# Patient Record
Sex: MALE | Race: WHITE | URBAN - METROPOLITAN AREA
[De-identification: names, ages, dates, MRNs, and addresses within clinical notes are randomized per-mention and may not be internally consistent; named-entity substitution may affect disease eponyms.]

---

## 2017-03-31 ENCOUNTER — OFFICE VISIT (OUTPATIENT)
Dept: URGENT CARE | Facility: RETAIL CLINIC | Age: 37
End: 2017-03-31
Payer: COMMERCIAL

## 2017-03-31 VITALS
SYSTOLIC BLOOD PRESSURE: 128 MMHG | HEART RATE: 105 BPM | WEIGHT: 241 LBS | OXYGEN SATURATION: 97 % | TEMPERATURE: 99.1 F | DIASTOLIC BLOOD PRESSURE: 76 MMHG | BODY MASS INDEX: 33.61 KG/M2

## 2017-03-31 DIAGNOSIS — R50.9 FEVER, UNSPECIFIED: Primary | ICD-10-CM

## 2017-03-31 DIAGNOSIS — J02.9 ACUTE PHARYNGITIS, UNSPECIFIED ETIOLOGY: ICD-10-CM

## 2017-03-31 LAB
FLUAV AG UPPER RESP QL IA.RAPID: NORMAL
FLUBV AG UPPER RESP QL IA.RAPID: NORMAL
S PYO AG THROAT QL IA.RAPID: NORMAL

## 2017-03-31 PROCEDURE — 87081 CULTURE SCREEN ONLY: CPT | Performed by: NURSE PRACTITIONER

## 2017-03-31 PROCEDURE — 99202 OFFICE O/P NEW SF 15 MIN: CPT | Performed by: NURSE PRACTITIONER

## 2017-03-31 PROCEDURE — 87804 INFLUENZA ASSAY W/OPTIC: CPT | Mod: QW | Performed by: NURSE PRACTITIONER

## 2017-03-31 PROCEDURE — 87880 STREP A ASSAY W/OPTIC: CPT | Mod: QW | Performed by: NURSE PRACTITIONER

## 2017-03-31 NOTE — NURSING NOTE
"Chief Complaint   Patient presents with     Generalized Body Aches     2-3 days.      Nose Problem     2-3 days       Initial /76 (BP Location: Right arm, Patient Position: Chair, Cuff Size: Adult Regular)  Pulse 105  Temp 99.1  F (37.3  C) (Tympanic)  Wt 241 lb (109.3 kg)  SpO2 97%  BMI 33.61 kg/m2 Estimated body mass index is 33.61 kg/(m^2) as calculated from the following:    Height as of 3/3/16: 5' 11\" (1.803 m).    Weight as of this encounter: 241 lb (109.3 kg).  Medication Reconciliation: complete   Amparo Le CMA (AAMA)      "

## 2017-03-31 NOTE — MR AVS SNAPSHOT
"              After Visit Summary   3/31/2017    Germain Barrett    MRN: [id number]           Patient Information     Date Of Birth          1980        Visit Information        Provider Department      3/31/2017 3:10 PM Myles Baptiste APRN [facility]        Today's Diagnoses     Fever, unspecified    -  1    Acute pharyngitis, unspecified etiology           Follow-ups after your visit        Who to contact     You can reach your care team any time of the day by calling [phone].  Notification of test results:  If you have an abnormal lab result, we will notify you by phone as soon as possible.         Additional Information About Your Visit        MyChart Information     [employer] lets you send messages to your doctor, view your test results, renew your prescriptions, schedule appointments and more. To sign up, go to [email or web address] . Click on \"Log in\" on the left side of the screen, which will take you to the Welcome page. Then click on \"Sign up Now\" on the right side of the page.     You will be asked to enter the access code listed below, as well as some personal information. Please follow the directions to create your username and password.     Your access code is: [email or web address]  Expires: 2017  3:47 PM     Your access code will  in 90 days. If you need help or a new code, please call your [place] clinic or [phone].        Care EveryWhere ID     This is your [facility] EveryWhere ID. This could be used by other organizations to access your [place] medical records  [id number]        Your Vitals Were     Pulse Temperature Pulse Oximetry BMI (Body Mass Index)          105 99.1  F (37.3  C) (Tympanic) 97% 33.61 kg/m2         Blood Pressure from Last 3 Encounters:   17 128/76   16 115/80   16 110/76    Weight from Last 3 Encounters:   17 241 lb (109.3 kg)   16 228 lb 14.4 oz (103.8 kg)   16 230 lb (104.3 kg)              We " Performed the Following     BETA STREP GROUP A R/O CULTURE     INFLUENZA A/B ANTIGEN     RAPID STREP SCREEN        Primary Care Provider Office Phone # Fax #    Kyle Huston [phone]       [address] [place] [address]        Thank you!     Thank you for choosing [facility]  for your care. Our goal is always to provide you with excellent care. Hearing back from our patients is one way we can continue to improve our services. Please take a few minutes to complete the written survey that you may receive in the mail after your visit with us. Thank you!             Your Updated Medication List - Protect others around you: Learn how to safely use, store and throw away your medicines at www.disposemymeds.org.          This list is accurate as of: 3/31/17  3:47 PM.  Always use your most recent med list.                   Brand Name Dispense Instructions for use    oxyCODONE-acetaminophen 5-325 MG per tablet    PERCOCET    45 tablet    Take 1-2 tablets by mouth every 4 hours as needed for pain (moderate to severe)

## 2017-03-31 NOTE — PROGRESS NOTES
[facility] note    SUBJECTIVE:  Germain Barrett is a 36 year old male who presents to [facility] with chief complaint of sore throat.    Onset of symptoms was 2-3 day(s) ago.    Course of illness: sudden onset and worsening.    Severity moderate  Course of illness:  Current and Associated symptoms: nasal congestion, rhinorrhea, sore throat, facial pain/pressure, headache, myalgias (neck and shoulders), some malaise and sneezing  Treatment measures tried at home include OTC meds.  Predisposing factors include None.    Current Outpatient Prescriptions   Medication     oxyCODONE-acetaminophen (PERCOCET) 5-325 MG per tablet     No current facility-administered medications for this visit.      Facility-Administered Medications Ordered in Other Visits   Medication     dimenhyDRINATE (DRAMAMINE) injection     scopolamine (TRANSDERM) 1 MG/3DAYS patch     PAST MEDICAL HISTORY: History reviewed. No pertinent past medical history.    PAST SURGICAL HISTORY:   Past Surgical History:   Procedure Laterality Date     COLONOSCOPY N/A 1/8/2016    Procedure: COMBINED COLONOSCOPY, SINGLE OR MULTIPLE BIOPSY/POLYPECTOMY BY BIOPSY;  Surgeon: Logan Hermosillo MD;  Location:  GI     EXCISE MASS TRUNK N/A 3/3/2016    Procedure: EXCISE MASS TRUNK;  Surgeon: Kyle Huston MD;  Location: [facility] OR     HERNIORRHAPHY UMBILICAL N/A 3/3/2016    Procedure: HERNIORRHAPHY UMBILICAL;  Surgeon: Kyle Huston MD;  Location: [facility] OR     LAPAROSCOPIC HERNIORRHAPHY INGUINAL Right 3/3/2016    Procedure: LAPAROSCOPIC HERNIORRHAPHY INGUINAL;  Surgeon: Kyle Huston MD;  Location: [facility] OR       FAMILY HISTORY:   Family History   Problem Relation Age of Onset     Colon Cancer Father      DIABETES No family hx of      Coronary Artery Disease No family hx of      Hypertension No family hx of      Breast Cancer No family hx of      Anesthesia Reaction No family hx of        SOCIAL HISTORY:    Social History   Substance Use Topics     Smoking status: Never Smoker     Smokeless tobacco: Never Used     Alcohol use 0.0 oz/week     0 Standard drinks or equivalent per week     ROS:  Review of systems negative except as stated above.    OBJECTIVE:   Vitals:    03/31/17 1506   BP: 128/76   BP Location: Right arm   Patient Position: Chair   Cuff Size: Adult Regular   Pulse: 105   Temp: 99.1  F (37.3  C)   TempSrc: Tympanic   SpO2: 97%   Weight: 241 lb (109.3 kg)     GENERAL APPEARANCE: alert, mild distress and cooperative  EYES: EOMI,  PERRL, conjunctiva clear  HENT: ear canals and TM's normal.  Nose normal.  Pharynx erythematous with some exudate noted.  NECK: supple, non-tender to palpation, no adenopathy noted  RESP: lungs clear to auscultation - no rales, rhonchi or wheezes  CV: regular rates and rhythm, normal S1 S2, no murmur noted  ABDOMEN:  soft, nontender, no HSM or masses and bowel sounds normal  SKIN: no suspicious lesions or rashes  NEURO: Normal strength and tone, sensory exam grossly normal,  normal speech and mentation    Rapid Strep test is negative; await throat culture results.  Influenza is: both negative.    ASSESSMENT:     Fever, unspecified  Acute pharyngitis, unspecified etiology      PLAN:   Outpatient Encounter Prescriptions as of 3/31/2017   Medication Sig Dispense Refill     oxyCODONE-acetaminophen (PERCOCET) 5-325 MG per tablet Take 1-2 tablets by mouth every 4 hours as needed for pain (moderate to severe) (Patient not taking: Reported on 3/31/2017) 45 tablet 0     Facility-Administered Encounter Medications as of 3/31/2017   Medication Dose Route Frequency Provider Last Rate Last Dose     dimenhyDRINATE (DRAMAMINE) injection    PRN Rock Chan APRN CRNA   25 mg at 03/03/16 1113     scopolamine (TRANSDERM) 1 MG/3DAYS patch    PRN Rock Chan APRN CRNA   1 patch at 03/03/16 1113     If not improving Follow up at:  [facility]  [phone]  Encourage good hydration (mainly water), may drink tea /c honey, warm chicken broth to sooth throat.  Soft foods may be preferred for several days.  Symptomatic treatment with warm Na+ H2O gargles, OTC analgesic, etc. discussed.   Strep culture pending.   Germain Barrett told positive cultures called only.  Rest as needed.  Follow-up with primary care provider if not improving.    If difficulty breathing or swallowing be seen immediately in the ED.    Myles Baptiste MSN, APRN, Family NP-C  [facility]

## 2017-04-03 LAB — BETA STREP CONFIRM: NORMAL

## 2018-11-04 ENCOUNTER — OFFICE VISIT (OUTPATIENT)
Dept: URGENT CARE | Facility: RETAIL CLINIC | Age: 38
End: 2018-11-04
Payer: COMMERCIAL

## 2018-11-04 VITALS
HEART RATE: 86 BPM | RESPIRATION RATE: 16 BRPM | SYSTOLIC BLOOD PRESSURE: 117 MMHG | TEMPERATURE: 98.7 F | BODY MASS INDEX: 32.08 KG/M2 | WEIGHT: 230 LBS | DIASTOLIC BLOOD PRESSURE: 80 MMHG | OXYGEN SATURATION: 97 %

## 2018-11-04 DIAGNOSIS — R09.81 NASAL SINUS CONGESTION: ICD-10-CM

## 2018-11-04 DIAGNOSIS — J31.0 PURULENT RHINITIS: Primary | ICD-10-CM

## 2018-11-04 PROCEDURE — 99213 OFFICE O/P EST LOW 20 MIN: CPT | Performed by: PHYSICIAN ASSISTANT

## 2018-11-04 RX ORDER — FLUTICASONE PROPIONATE 50 MCG
1-2 SPRAY, SUSPENSION (ML) NASAL DAILY
Qty: 1 BOTTLE | Refills: 0 | Status: SHIPPED | OUTPATIENT
Start: 2018-11-04

## 2018-11-04 ASSESSMENT — PAIN SCALES - GENERAL: PAINLEVEL: MODERATE PAIN (4)

## 2018-11-04 NOTE — MR AVS SNAPSHOT
"              After Visit Summary   2018    Germain Barrett    MRN: [id number]           Patient Information     Date Of Birth          1980        Visit Information        Provider Department      2018 10:30 AM Roselia Chaney PA-C [facility]        Today's Diagnoses     Purulent rhinitis    -  1    Nasal sinus congestion          Care Instructions      Please FOLLOW UP at primary care clinic if not improving, new symptoms, worse or this does not resolve.  [facility]  [phone]  [facility]  [phone]            Follow-ups after your visit        Who to contact     You can reach your care team any time of the day by calling [phone].  Notification of test results:  If you have an abnormal lab result, we will notify you by phone as soon as possible.         Additional Information About Your Visit        MyChart Information     [employer] lets you send messages to your doctor, view your test results, renew your prescriptions, schedule appointments and more. To sign up, go to [email or web address] . Click on \"Log in\" on the left side of the screen, which will take you to the Welcome page. Then click on \"Sign up Now\" on the right side of the page.     You will be asked to enter the access code listed below, as well as some personal information. Please follow the directions to create your username and password.     Your access code is: [email or web address]  Expires: 2019 10:54 AM     Your access code will  in 90 days. If you need help or a new code, please call your [facility] or [phone].        Care EveryWhere ID     This is your Care EveryWhere ID. This could be used by other organizations to access your [place] medical records  [id number]        Your Vitals Were     Pulse Temperature Respirations Pulse Oximetry BMI (Body Mass Index)       86 98.7  F (37.1  C) (Tympanic) 16 97% 32.08 kg/m2        Blood Pressure from Last 3 " Encounters:   11/04/18 117/80   03/31/17 128/76   03/03/16 115/80    Weight from Last 3 Encounters:   11/04/18 230 lb (104.3 kg)   03/31/17 241 lb (109.3 kg)   03/22/16 228 lb 14.4 oz (103.8 kg)              Today, you had the following     No orders found for display         Today's Medication Changes          These changes are accurate as of 11/4/18 10:54 AM.  If you have any questions, ask your nurse or doctor.               Start taking these medicines.        Dose/Directions    amoxicillin-clavulanate 875-125 MG per tablet   Commonly known as:  AUGMENTIN   Used for:  Purulent rhinitis, Nasal sinus congestion   Started by:  Roselia Chaney PA-C        Dose:  1 tablet   Take 1 tablet by mouth 2 times daily for 10 days   Quantity:  20 tablet   Refills:  0       fluticasone 50 MCG/ACT spray   Commonly known as:  FLONASE   Used for:  Purulent rhinitis, Nasal sinus congestion   Started by:  Roselia Chaney PA-C        Dose:  1-2 spray   Spray 1-2 sprays into both nostrils daily   Quantity:  1 Bottle   Refills:  0            Where to get your medicines      These medications were sent to [address]  [address] [facility] [address]     Phone:  [phone]     amoxicillin-clavulanate 875-125 MG per tablet    fluticasone 50 MCG/ACT spray                Primary Care Provider Office Phone # Fax #    Kyle Huston [phone]       [facility] [address] [address] [address]        Equal Access to Services     [facility] AH: Hadii geovanni lund hadasho Soomaali, waaxda luqadaha, qaybta kaalmada adeegyada, stephen jamison. So [facility] [phone].    ATENCIÓN: Si habla español, tiene a conley disposición servicios gratuitos de asistencia lingüística. Cynthia argueta [phone].    We comply with applicable federal civil rights laws and [place] laws. We do not discriminate on the basis of race, color, national  origin, age, disability, sex, sexual orientation, or gender identity.            Thank you!     Thank you for choosing [facility]  for your care. Our goal is always to provide you with excellent care. Hearing back from our patients is one way we can continue to improve our services. Please take a few minutes to complete the written survey that you may receive in the mail after your visit with us. Thank you!             Your Updated Medication List - Protect others around you: Learn how to safely use, store and throw away your medicines at www.disposemymeds.org.          This list is accurate as of 11/4/18 10:54 AM.  Always use your most recent med list.                   Brand Name Dispense Instructions for use Diagnosis    amoxicillin-clavulanate 875-125 MG per tablet    AUGMENTIN    20 tablet    Take 1 tablet by mouth 2 times daily for 10 days    Purulent rhinitis, Nasal sinus congestion       fluticasone 50 MCG/ACT spray    FLONASE    1 Bottle    Spray 1-2 sprays into both nostrils daily    Purulent rhinitis, Nasal sinus congestion

## 2018-11-04 NOTE — PATIENT INSTRUCTIONS
Please FOLLOW UP at primary care clinic if not improving, new symptoms, worse or this does not resolve.  [facility]  [phone]  [facility]  [phone]

## 2018-11-04 NOTE — NURSING NOTE
"Chief Complaint   Patient presents with     Sinus Problem     x 1 week      Estimated body mass index is 32.08 kg/(m^2) as calculated from the following:    Height as of 3/3/16: 5' 11\" (1.803 m).    Weight as of this encounter: 230 lb (104.3 kg).  BP Readings from Last 1 Encounters:   11/04/18 117/80   ]  BP cuff size:  large  Rafaela Saucedo CMA (AAMA)   "

## 2018-11-04 NOTE — PROGRESS NOTES
Chief Complaint   Patient presents with     Sinus Problem     x 1 week          SUBJECTIVE:   Pt. presenting to [facility] -  with a chief complaint of nasal and sinus congestion x 1 week. Past few days purulent discharge - > HA and pressure and more tired. Some cough from PND. .   See CC.  Hx of asthma no.  Onset of symptoms gradual  Course of illness is worsening.    Severity moderate  Current and Associated symptoms: feverish at times, runny nose, stuffy nose, cough - non-productive, facial pain/pressure, headache and fatigue  Treatment measures tried include Tylenol/Ibuprofen, Decongestants, Antihistamine and OTC Cough med.  Predisposing factors include None.  Last antibiotic none x years  Smoker no    ROS:  ENT - denies ear pain, throat pain.    CP - occasional dry cough. No SOB or chest pain   GI- - appetite ok. No nausea, vomiting or diarrhea.   No bowel or bladder changes   MSK - no joint pain or swelling   Skin: No rashes    No past medical history on file.  Past Surgical History:   Procedure Laterality Date     COLONOSCOPY N/A 1/8/2016    Procedure: COMBINED COLONOSCOPY, SINGLE OR MULTIPLE BIOPSY/POLYPECTOMY BY BIOPSY;  Surgeon: Logan Hermosillo MD;  Location:  GI     EXCISE MASS TRUNK N/A 3/3/2016    Procedure: EXCISE MASS TRUNK;  Surgeon: Kyle Huston MD;  Location: [facility] OR     HERNIORRHAPHY UMBILICAL N/A 3/3/2016    Procedure: HERNIORRHAPHY UMBILICAL;  Surgeon: Kyle Huston MD;  Location: [facility] OR     LAPAROSCOPIC HERNIORRHAPHY INGUINAL Right 3/3/2016    Procedure: LAPAROSCOPIC HERNIORRHAPHY INGUINAL;  Surgeon: Kyle Huston MD;  Location: [facility] OR     Patient Active Problem List   Diagnosis     Unilateral inguinal hernia without obstruction or gangrene, recurrence not specified     Family history of colon cancer       OBJECTIVE:  /80  Pulse 86  Temp 98.7  F (37.1  C) (Tympanic)  Resp 16  Wt 230 lb (104.3 kg)  SpO2 97%  BMI 32.08 kg/m2    GENERAL  APPEARANCE: cooperative, alert and no distress. Appears well hydrated.  EYES: conjunctiva clear  HENT: Rt ear canal  clear and TM normal   Lt ear canal clear and TM normal   Nose mod congestion. Thick purulent discharge  Mouth without ulcers or lesions. no erythema. no exudate. PND noted. Tonsils 3/4  NECK: supple, few small shoddy NT ant nodes. No  posterior nodes.  RESP: lungs clear to auscultation - no rales, rhonchi or wheezes. Breathing easily.  CV: regular rates and rhythm  ABDOMEN:  soft, nontender, no HSM or masses and bowel sounds normal   SKIN: no suspicious lesions or rashes  some tenderness to palpate over milad max sinus areas.      ASSESSMENT:     Purulent rhinitis  Nasal sinus congestion      PLAN:  Symptomatic measures   Prescriptions as below. Discussed indications, dosing, side affects and adverse reactions of medications with  Patient - Augmentin and Flonase  Eat yogurt daily or take a probiotic supplement when on antibiotics.  OTC cough suppressant/expectorant discussed.  Salt water gargles -throat lozenges or honey/lemon tea if soothing .  saline nasal spray for  nasal congestion .  Cool mist vaporizer.   Stay in clean air environment.  > rest.  > fluids.  Contagiousness and hygiene discussed.  Fever and pain  control measures discussed.   If unable to swallow or any breathing difficulty to go to ED   AVS given and discussed:  Patient Instructions     Please FOLLOW UP at primary care clinic if not improving, new symptoms, worse or this does not resolve.  [facility]  [phone]  [facility]  [phone]      PT is comfortable with this plan.  Electronically signed,  TSERING Chaney, PAC

## 2020-03-11 ENCOUNTER — TRANSFERRED RECORDS (OUTPATIENT)
Dept: HEALTH INFORMATION MANAGEMENT | Facility: CLINIC | Age: 40
End: 2020-03-11